# Patient Record
Sex: MALE | Race: WHITE | Employment: FULL TIME | URBAN - METROPOLITAN AREA
[De-identification: names, ages, dates, MRNs, and addresses within clinical notes are randomized per-mention and may not be internally consistent; named-entity substitution may affect disease eponyms.]

---

## 2017-07-06 ENCOUNTER — HOSPITAL ENCOUNTER (OUTPATIENT)
Dept: PHYSICAL THERAPY | Age: 62
Discharge: HOME | End: 2017-07-06
Payer: COMMERCIAL

## 2017-07-06 PROCEDURE — 97530 THERAPEUTIC ACTIVITIES: CPT

## 2017-07-06 PROCEDURE — 97161 PT EVAL LOW COMPLEX 20 MIN: CPT

## 2017-07-06 PROCEDURE — 97110 THERAPEUTIC EXERCISES: CPT

## 2017-07-06 NOTE — PROGRESS NOTES
[address]es [address]  [facility] PHYSICAL THERAPY  [address] [facility] Stephanie Guzman, [address] - Phone: [phone]  Fax: [phone] / [address]  Patient Name: Lora Alonzo : 1955   Medical   Diagnosis: Low back pain [M54.5] Treatment Diagnosis: L5-S1 LLE lumbar radiculopathy   Onset Date: 1 year ago AGE: 63 y.o. Referral Source: Davy Degroot MD [facility]): 2017   Prior Hospitalization: See medical history Provider #: [id number]   Prior Level of Function: Independent with limitations ambulating > 45 minutes   Comorbidities: Hx Prostate cancer   Medications: Verified on Patient Summary List   The Plan of Care and following information is based on the information from the initial evaluation.   =======================================================================================  Assessment / key information:  Patient is a 63 y.o. male who presents with chief complaint of constant numbness/tingling in L lateral calf and digits 3-5 of L foot (L5-S1). Pt reports symptoms began approximately 1 year ago and worsen with prolonged ambulation. Pt presents to PT evaluation today with postural deficiencies, 6-7/10 numbness/tingling in the areas described above, dec knee flexion, hip abduction/extension strength and lumbar instability. Pt demo ability to reduce intensity of symptoms with repeated L/S extension both in WB and non-WB positions. Patient would benefit from skilled PT services to address these issues and improve core stability, postural deficits, LE flexibility, scapulothoracic and LE strength .   Thank you for this referral       Objective Data:  ROM/Strength                 Strength (1-5)  Hip Left Right   Flexion (L1,L2) 5 5   Extension 4 4+   Abduction 4- 4-   ER 4+ 4+   IR 4+ 4+   Knee Left Right   Extension (L3,L4) 5 5   Flexion (S1,S2) 4 5   Ankle Left Right   PF (S1) 4+ 4+   DF (L4) 5 5 ======================================================================================  Eval Complexity: History: MEDIUM  Complexity : 1-2 comorbidities / personal factors will impact the outcome/ POC Exam:MEDIUM Complexity : 3 Standardized tests and measures addressing body structure, function, activity limitation and / or participation in recreation  Presentation: LOW Complexity : Stable, uncomplicated  Clinical Decision Making:LOW Complexity : FOTO score of 75-100Overall Complexity:LOW   Problem List: decrease ROM, decrease strength, decrease ADL/ functional abilitiies, decrease activity tolerance and decrease flexibility/ joint mobility   Treatment Plan may include any combination of the following: Therapeutic exercise, Therapeutic activities, Neuromuscular re-education, Physical agent/modality, Gait/balance training, Manual therapy, Aquatic therapy, Patient education, Functional mobility training and Stair training  Patient / Family readiness to learn indicated by: asking questions, trying to perform skills and interest  Persons(s) to be included in education: patient (P)  Barriers to Learning/Limitations: None  Measures taken:    Patient Goal (s): \"fix the problem\"   Patient self reported health status: good  Rehabilitation Potential: good   Short Term Goals: To be accomplished in  2  Weeks:  1. Patient will be compliant with HEP in order to facilitate progression of therapeutic exercise and improve mobility  2. Patient will demonstrate ability to independently centralize LLE radicular symptoms to level of L/S in order to dec LE dysfunction  3. Patient will report GROC >/= +2 in order to demonstrate improvement in symptoms since IE     Long Term Goals: To be accomplished in  4  Weeks:  1. Patient will be independent with HEP in order to demonstrate ability to perform therapeutic exercises and continue progressing functional mobility upon D/C  2.  Patient will report ability to ambulate >45 minutes without LLE radicular symptoms to improve QOL and reduce LE dysfunction  3. Patient will improve FOTO score to 85/100 to demonstrate a meaningful improvement in functional mobility and increased quality of life      Frequency / Duration:   Patient to be seen  2  times per week for 4  weeks:  Patient / Caregiver education and instruction: self care, activity modification and exercises  G-Codes (GP): fabiola  Therapist Signature: Silvano Johnson DPT Date: 2/8/1672   Certification Period: na Time: 9:54 AM   ===========================================================================================  I certify that the above Physical Therapy Services are being furnished while the patient is under my care. I agree with the treatment plan and certify that this therapy is necessary. Physician Signature:        Date:       Time:     Please sign and return to [employer] or you may fax the signed copy to [phone]. Thank you.

## 2017-07-06 NOTE — PROGRESS NOTES
PHYSICAL THERAPY - DAILY TREATMENT NOTE    Patient Name: Jabier Hodge        Date: 2017  : 1955   YES Patient  Verified  Visit #:   1   of   8  Insurance: Payor: Marisol Correa / Plan: [facility] PPO / Product Type: PPO /      In time: 10:00 Out time: 10:55   Total Treatment Time: 55 min     TREATMENT AREA =  Lumbar radiculopathy    SUBJECTIVE    Pain Level (on 0 to 10 scale):  0  / 10, 6-7/10 numbness   Medication Changes/New allergies or changes in medical history, any new surgeries or procedures? NO    If yes, update Summary List   Subjective Functional Status/Changes:  []  No changes reported     Patient reports onset of LLE numbness that started about a year or two ago. Pt was sent to a specialist and referred to physical therapy. Pt reports the toes of L foot 3-5 get numb. Worse with walking. Also reports numbness/tingling along L lateral calf. Pt reports he recently went on a 6 mile walk and felt extreme numbness. OBJECTIVE    Physical Therapy Evaluation - Lumbar Spine ([facility])    SUBJECTIVE  Chief Complaint: LLE numbness/tingling    Mechanism of injury: Insidious    Occupation/Recreation: Pianist, Professor    Functional Status  Prior level of function: Independent  Present functional limitations: Prolonged walking  What position do you sleep in?:    Symptoms:  Pain rating (0-10): Today: 0   Best: 0   Worst: 0   [x] Contstant: constant numbness/tingling   [] Intermittent:     Aggravated by:   [] Bending [] Sitting [] Standing [x] Walking   [] Moving [] Cough [] Sneeze [] Valsalva   [] AM  [] PM  Lying:  [] sup   [] pro   [] sidelying   [] Other:     Eased by:  Massage, taking off shoe   [] Bending [] Sitting [] Standing [] Walking   [] Moving [] AM  [] PM  Lying: [] sup  [] pro  [] sidelying   [] Other:     General Health:  Red Flags Indicated? [] Yes    [] No  [] Yes [x] No Recent weight change (If yes, due to dieting?  [] Yes  [] No)   [] Yes [x] No Weakness in legs during walking  [] Yes [x] No Unremitting pain at night  [] Yes [x] No Abdominal pain or problems  [] Yes [x] No Rectal bleeding  [] Yes [x] No Feet more cold or painful in cold weather  [] Yes [x] No Menstrual irregularities  [] Yes [x] No Blood or pain with urination  [] Yes [x] No Dysfunction of bowel or bladder  [] Yes [x] No Recent illness within past 3 weeks (i.e, cold, flu)  [] Yes [x] No Numbness/tingling in buttock/genitalia region    Past History/Treatments:     Diagnostic Tests: [] Lab work [] X-rays    [] CT [] MRI     [] Other:  Results: None    OBJECTIVE  Posture:  Lateral Shift: [x] R    [x] L     [] +  [x] -  Kyphosis: [x] Increased [] Decreased   []  WNL  Lordosis:  [] Increased [x] Decreased   [] WNL  Pelvic symmetry: [x] WNL    [] Other:    Gait:  [x] Normal     [] Abnormal:    Active Movements: [] N/A   [] Too acute   [] Other:  ROM AROM Comments:pain, area   Forward flexion 40-60 Fingertips to mid tibia    Extension 20-30 50% dec    SB right 20-30 WNL    SB left 20-30 WNL    Rotation right 5-10 25% dec    Rotation left 5-10 25% dec       Repeated Movements   Effects on present pain: produces (LA), abolishes (A), increases (incr), decreases(decr), centralizes (C), peripheral (PH), no effect (NE)   Pre-Test Sx Flexion Repeated Flexion Extension Repeated Extension Repeated SBL Repeated SBR   Sitting  NE NE NE NE NT NT   Standing  NE NE NE Decr NE Decr   Lying  NT    N/A N/A   Comments:  Side Glide:  Sustained passive positioning test:      ROM/Strength  Strength (1-5)  Hip Left Right   Flexion (L1,L2) 5 5   Extension 4 4+   Abduction 4- 4-   ER 4+ 4+   IR 4+ 4+   Knee Left Right   Extension (L3,L4) 5 5   Flexion (S1,S2) 4 5   Ankle Left Right   PF (S1) 4+ 4+   DF (L4) 5 5       Neuro Screen [] WNL  Myotome/Dermatome/Reflexes: Dec SSLT L5-S1 dermatome    Dural Mobility:  SLR Sitting: [x] R    [x] L    [] +    [x] -  @ (degrees):           Supine: [x] R    [x] L    [] +    [x] -  @ (degrees):   Slump Test: [x] R    [x] L    [] +    [x] -  @ (degrees):     Palpation  [] Min  [x] Mod  [] Severe    Location: hamstrings, ITB bilaterally  [] Min  [] Mod  [] Severe    Location:  [] Min  [] Mod  [] Severe    Location:      10 min Therapeutic Exercise:  [x]  See flow sheet   Rationale: To reduce numbness/tingling in LLE in order to dec LE dysfunction    8 min Therapeutic Activity: FOTO   Rationale: To assess current functional limitations and review POC    throughout min Patient Education:  YES  Reviewed HEP   []  Progressed/Changed HEP based on: Other Objective/Functional Measures:    See objective data above     Post Treatment Pain Level (on 0 to 10) scale:   0  / 10     ASSESSMENT    Assessment/Changes in Function:     Patient History: Moderate (Prostate cancer, Age)  Examination (low 1-2, mod 3+, high 4+): Moderate per objective above  Clinical Presentation (low stable or uncomplicated, mod evolving or changing, high unstable or unpredictable): Low stable  Clinical Decision Making (low , mod 26-74, high 1-25): FOTO Low     [x]  See Progress Note/Recertification   Patient will continue to benefit from skilled PT services to modify and progress therapeutic interventions, address functional mobility deficits, address ROM deficits, address strength deficits, analyze and address soft tissue restrictions, analyze and cue movement patterns, analyze and modify body mechanics/ergonomics, assess and modify postural abnormalities, address imbalance/dizziness and instruct in home and community integration to attain remaining goals.    Progress toward goals / Updated goals:    See plan of care     PLAN    [x]  Upgrade activities as tolerated YES Continue plan of care   []  Discharge due to :    []  Other:

## 2017-07-17 ENCOUNTER — HOSPITAL ENCOUNTER (OUTPATIENT)
Dept: PHYSICAL THERAPY | Age: 62
Discharge: HOME | End: 2017-07-17
Payer: COMMERCIAL

## 2017-07-17 PROCEDURE — 97110 THERAPEUTIC EXERCISES: CPT

## 2017-07-17 PROCEDURE — 97530 THERAPEUTIC ACTIVITIES: CPT

## 2017-07-17 NOTE — PROGRESS NOTES
PHYSICAL THERAPY - DAILY TREATMENT NOTE    Patient Name: Mecca Wilkins        Date: 2017  : 1955   yes Patient  Verified  Visit #:   2   of   8  Insurance: Payor: BLUE CROSS / Plan: [facility] PPO / Product Type: PPO /      In time: 400 Out time: 435   Total Treatment Time: 35     TREATMENT AREA =  Low back pain [M54.5]    SUBJECTIVE  Pain Level (on 0 to 10 scale):  3  / 10   Medication Changes/New allergies or changes in medical history, any new surgeries or procedures?    no  If yes, update Summary List   Subjective Functional Status/Changes:  []  No changes reported     \"I am not really seeing any change. Sometimes it isnt there. I can ride my bike and be ok for 6 miles.  I  did try the extensions and it wasn't changing at all\"          OBJECTIVE      27 min Therapeutic Exercise:  [x]  See flow sheet   Rationale:      increase ROM, increase strength and improve coordination to improve the patients ability to perform ADLs/ bending/stooping/ lifting/prolong sitting, stding and amb/ stairs with ease        8 min Therapeutic Activity: postural training   Rationale:    increase ROM and increase strength to improve the patients ability to demo proper posture with ease       min Patient Education:  yes  Reviewed HEP   []  Progressed/Changed HEP based on:  Pt ed on importance and benefits of compliance with HEP, core strength/stability and proper posture; pt verbalized understanding         Other Objective/Functional Measures:  prone lying>YOLI>bed elevated-B/NE  ARMIN=NE/NE  Rep L SG at wall-NE/NE  Rep step stance flex with L Rot- NE/NE  REp step stance flex with R Rot-B/B; instructed to perform every hr x 10    VCs + demo to perform proper technique for TE  Initiated TE per flowsheet without c/o p!  demos poor posture improved with VCs       Post Treatment Pain Level (on 0 to 10) scale:   3  / 10     ASSESSMENT  Assessment/Changes in Function:     Progressed there-ex without c/o increase p! demos significant decrease in HS flexibility      []  See Progress Note/Recertification   Patient will continue to benefit from skilled PT services to modify and progress therapeutic interventions, address functional mobility deficits, address ROM deficits, address strength deficits, analyze and address soft tissue restrictions, analyze and cue movement patterns, analyze and modify body mechanics/ergonomics, assess and modify postural abnormalities and instruct in home and community integration to attain remaining goals.    Progress toward goals / Updated goals:    Pt's first visit since IE, no noted progress        PLAN  []  Upgrade activities as tolerated yes Continue plan of care   []  Discharge due to :    []  Other:      Therapist: Sanford Magdaleno PTA    Date: 7/17/2017 Time: 5:10 PM     Future Appointments  Date Time Provider Juanita Smiley   7/24/2017 10:00 AM Waqas Dejesus PT [facility]   7/28/2017 1:00 PM Sanford Magdaleno PTA [facility]   7/31/2017 10:30 AM Waqas Dejesus PT [facility]   8/4/2017 10:30 AM Sanford Magdaleno PTA [facility]   8/7/2017 10:30 AM [facility]   8/11/2017 10:30 AM Sanford Magdaleno PTA [facility]

## 2017-07-24 ENCOUNTER — APPOINTMENT (OUTPATIENT)
Dept: PHYSICAL THERAPY | Age: 62
End: 2017-07-24
Payer: COMMERCIAL

## 2017-07-28 ENCOUNTER — HOSPITAL ENCOUNTER (OUTPATIENT)
Dept: PHYSICAL THERAPY | Age: 62
Discharge: HOME | End: 2017-07-28
Payer: COMMERCIAL

## 2017-07-28 PROCEDURE — 97530 THERAPEUTIC ACTIVITIES: CPT

## 2017-07-28 PROCEDURE — 97110 THERAPEUTIC EXERCISES: CPT

## 2017-07-28 NOTE — PROGRESS NOTES
PHYSICAL THERAPY - DAILY TREATMENT NOTE    Patient Name: Lora Figures        Date: 2017  : 1955   yes Patient  Verified  Visit #:   3 of   8  Insurance: Payor: Magdalena Martin / Plan: [facility] PPO / Product Type: PPO /      In time: 100 Out time: 152   Total Treatment Time: 52     TREATMENT AREA =  Low back pain [M54.5]    SUBJECTIVE  Pain Level (on 0 to 10 scale):  0  / 10   Medication Changes/New allergies or changes in medical history, any new surgeries or procedures?    no  If yes, update Summary List   Subjective Functional Status/Changes:  []  No changes reported     \"I did my exercises and it really didn't change much. I still have p! down the leg 7/10 sometimes           OBJECTIVE      44 min Therapeutic Exercise:  [x]  See flow sheet   Rationale:      increase ROM, increase strength and improve coordination to improve the patients ability to perform ADLs/ bending/stooping/ lifting/prolong sitting, stding and amb/ stairs with ease        8 min Therapeutic Activity: postural training   Rationale:    increase ROM and increase strength to improve the patients ability to demo proper posture with ease       min Patient Education:  yes  Reviewed HEP   []  Progressed/Changed HEP based on:  Pt ed on importance and benefits of compliance with HEP, core strength/stability and proper posture; pt verbalized understanding     Other Objective/Functional Measures:    VCs + demo to perform proper technique for TE  initiated TM retro; t/s ext over chair, scap stabs and mod scott str without c/o p!  continues to demo poor posture with sitting; improved with VCs     Post Treatment Pain Level (on 0 to 10) scale:  0 / 10     ASSESSMENT  Assessment/Changes in Function:     Progressed there-ex without c/o increase p!        []  See Progress Note/Recertification   Patient will continue to benefit from skilled PT services to modify and progress therapeutic interventions, address functional mobility deficits, address ROM deficits, address strength deficits, analyze and address soft tissue restrictions, analyze and cue movement patterns, analyze and modify body mechanics/ergonomics, assess and modify postural abnormalities and instruct in home and community integration to attain remaining goals. Progress toward goals / Updated goals: · Short Term Goals: To be accomplished in  2  Weeks:  1. Patient will be compliant with HEP in order to facilitate progression of therapeutic exercise and improve mobility. MET  2. Patient will demonstrate ability to independently centralize LLE radicular symptoms to level of L/S in order to dec LE dysfunction  3. Patient will report GROC >/= +2 in order to demonstrate improvement in symptoms since IE     · Long Term Goals: To be accomplished in  4  Weeks:  1. Patient will be independent with HEP in order to demonstrate ability to perform therapeutic exercises and continue progressing functional mobility upon D/C  2. Patient will report ability to ambulate >45 minutes without LLE radicular symptoms to improve QOL and reduce LE dysfunction  3.  Patient will improve FOTO score to 85/100 to demonstrate a meaningful improvement in functional mobility and increased quality of life          PLAN  []  Upgrade activities as tolerated yes Continue plan of care   []  Discharge due to :    []  Other:      Therapist: Gab Gomez PTA    Date: 7/28/2017 Time: 1:06 PM     Future Appointments  Date Time Provider Juanita Smiley   7/31/2017 10:30 AM Justyna Cueva PT [facility]   8/4/2017 10:30 AM [facility]   8/7/2017 10:30 AM [facility]   8/11/2017 10:30 AM Gab Gomez PTA [facility]

## 2017-07-31 ENCOUNTER — HOSPITAL ENCOUNTER (OUTPATIENT)
Dept: PHYSICAL THERAPY | Age: 62
Discharge: HOME | End: 2017-07-31
Payer: COMMERCIAL

## 2017-07-31 PROCEDURE — 97110 THERAPEUTIC EXERCISES: CPT

## 2017-07-31 NOTE — PROGRESS NOTES
PHYSICAL THERAPY - DAILY TREATMENT NOTE    Patient Name: Juan Bah        Date: 2017  : 1955   YES Patient  Verified  Visit #:   4   of   8  Insurance: Payor: Mavis Bone / Plan: [id number] PPO / Product Type: PPO /      In time: 10:30 Out time: 11:10   Total Treatment Time: 40     Medicare Time Tracking (below)   Total Timed Codes (min):  NA 1:1 Treatment Time:  NA     TREATMENT AREA =  L/S radic    SUBJECTIVE    Pain Level (on 0 to 10 scale):  1  / 10 foot    Medication Changes/New allergies or changes in medical history, any new surgeries or procedures? NO    If yes, update Summary List   Subjective Functional Status/Changes:  []  No changes reported     \"It doesn't;t hurt when I sit. It's really only my outside two toes and tenderness in my lateral calf. If I even shift my weight off its better. \"         OBJECTIVE      40 min Therapeutic Exercise:  [x]  See flow sheet   Rationale:      increase ROM and increase strength to improve the patients ability to manage symptoms      min Patient Education:  Cisco Wellington   []  Progressed/Changed HEP based on:   Updated HEP     Other Objective/Functional Measures:    Instructed on ankle exercises due to no change thus far with spinal exercises. Diff Dx to rule out distal origin     Post Treatment Pain Level (on 0 to 10) scale:   0  / 10     ASSESSMENT    Assessment/Changes in Function:     No changes thus far     []  See Progress Note/Recertification   Patient will continue to benefit from skilled PT services to analyze,, cue,, progress,, modify,, demonstrate,, instruct, and address, movement patterns,, therapeutic interventions,, postural abnormalities,, soft tissue restrictions,, ROM,, strength,, functional mobility,, body mechanics/ergonomics, and home and community integration, to attain remaining goals.    Progress toward goals / Updated goals:    Reassess NV at 5th visit     PLAN    []  Upgrade activities as tolerated YES Continue plan of care   []  Discharge due to :    []  Other:      Therapist: Filomena Byrnes DPT    Date: 7/31/2017 Time: 10:29 AM   Future Appointments  Date Time Provider Juanita Smiley   7/31/2017 10:30 AM Royal Cobb [facility]   8/4/2017 10:30 AM Raven Gustafson [facility]   8/7/2017 10:30 AM Arabella Thakkar [facility]   8/11/2017 10:30 AM Raven Gustafson [facility]

## 2017-08-04 ENCOUNTER — HOSPITAL ENCOUNTER (OUTPATIENT)
Dept: PHYSICAL THERAPY | Age: 62
Discharge: HOME | End: 2017-08-04
Payer: COMMERCIAL

## 2017-08-04 PROCEDURE — 97110 THERAPEUTIC EXERCISES: CPT

## 2017-08-04 NOTE — PROGRESS NOTES
PHYSICAL THERAPY - DAILY TREATMENT NOTE    Patient Name: Rufina Sotelo        Date: 2017  : 1955   yes Patient  Verified  Visit #:   5 of   8  Insurance: Payor: Sridhar Bahena / Plan: [facility] PPO / Product Type: PPO /      In time: 1030 Out time: 1110   Total Treatment Time: 40     TREATMENT AREA =  Low back pain [M54.5]    SUBJECTIVE  Pain Level (on 0 to 10 scale):  2  / 10   Medication Changes/New allergies or changes in medical history, any new surgeries or procedures?    no  If yes, update Summary List   Subjective Functional Status/Changes:  []  No changes reported     \"I really did not notice much change at all with the new stuff she added 2 days ago. I would like to do maybe appts further apart to see if it helps, I do not think 2 days is a enough\"           OBJECTIVE      40 min Therapeutic Exercise:  [x]  See flow sheet   Rationale:      increase ROM, increase strength and improve coordination to improve the patients ability to perform ADLs/ bending/stooping/ lifting/prolong sitting, stding and amb/ stairs with ease       min Patient Education:  yes  Reviewed HEP   []  Progressed/Changed HEP based on:  Pt ed on importance and benefits of compliance with HEP, core strength/stability and proper posture; pt verbalized understanding     Other Objective/Functional Measures:    VCs + demo to perform proper technique for TE  initiated TM retro; HRs/TRs and bridges without c/o p!  demos fair+ bridge     Post Treatment Pain Level (on 0 to 10) scale:  1 / 10     ASSESSMENT  Assessment/Changes in Function:   demos 50% HR AROM  Progressed there-ex without c/o increase p!    []  See Progress Note/Recertification   Patient will continue to benefit from skilled PT services to modify and progress therapeutic interventions, address functional mobility deficits, address ROM deficits, address strength deficits, analyze and address soft tissue restrictions, analyze and cue movement patterns, analyze and modify body mechanics/ergonomics, assess and modify postural abnormalities and instruct in home and community integration to attain remaining goals. Progress toward goals / Updated goals: · Short Term Goals: To be accomplished in  2  Weeks:  1. Patient will be compliant with HEP in order to facilitate progression of therapeutic exercise and improve mobility. MET  2. Patient will demonstrate ability to independently centralize LLE radicular symptoms to level of L/S in order to dec LE dysfunction  3. Patient will report GROC >/= +2 in order to demonstrate improvement in symptoms since IE     · Long Term Goals: To be accomplished in  4  Weeks:  1. Patient will be independent with HEP in order to demonstrate ability to perform therapeutic exercises and continue progressing functional mobility upon D/C  2. Patient will report ability to ambulate >45 minutes without LLE radicular symptoms to improve QOL and reduce LE dysfunction  3.  Patient will improve FOTO score to 85/100 to demonstrate a meaningful improvement in functional mobility and increased quality of life          PLAN  []  Upgrade activities as tolerated yes Continue plan of care   []  Discharge due to :    []  Other:      Therapist: Lizbeth Hill PTA    Date: 8/4/2017 Time: 4:05 PM     Future Appointments  Date Time Provider Juanita Smiley   8/11/2017 10:30 AM [facility]   8/18/2017 10:30 AM [facility]   8/25/2017 10:30 AM Lizbeth Hill PTA [facility]

## 2017-08-07 ENCOUNTER — APPOINTMENT (OUTPATIENT)
Dept: PHYSICAL THERAPY | Age: 62
End: 2017-08-07
Payer: COMMERCIAL

## 2017-08-11 ENCOUNTER — HOSPITAL ENCOUNTER (OUTPATIENT)
Dept: PHYSICAL THERAPY | Age: 62
Discharge: HOME | End: 2017-08-11
Payer: COMMERCIAL

## 2017-08-11 PROCEDURE — 97110 THERAPEUTIC EXERCISES: CPT

## 2017-08-11 NOTE — PROGRESS NOTES
PHYSICAL THERAPY - DAILY TREATMENT NOTE    Patient Name: Josefa Post        Date: 2017  : 1955   yes Patient  Verified  Visit #:   6    8  Insurance: Payor: BLUE CROSS / Plan: [facility] PPO / Product Type: PPO /      In time: 1030 Out time: 1116   Total Treatment Time: 46     TREATMENT AREA =  Low back pain [M54.5]    SUBJECTIVE  Pain Level (on 0 to 10 scale):  2-3   10   Medication Changes/New allergies or changes in medical history, any new surgeries or procedures?    no  If yes, update Summary List   Subjective Functional Status/Changes:  []  No changes reported     \"last time I was here I was good for 3 days and then it came back\"           OBJECTIVE      46 min Therapeutic Exercise:  [x]  See flow sheet   Rationale:      increase ROM, increase strength and improve coordination to improve the patients ability to perform ADLs/ bending/stooping/ lifting/prolong sitting, stding and amb/ stairs with ease       min Patient Education:  yes  Reviewed HEP   []  Progressed/Changed HEP based on:  Pt ed on importance and benefits of compliance with HEP, core strength/stability and proper posture; pt verbalized understanding     Other Objective/Functional Measures:    VCs + demo to perform proper technique for TE  initiated prone glute kicks and performed t/s ext on FR without c/o P!  demos 75% HRS AROM     Post Treatment Pain Level (on 0 to 10) scale:  2  10     ASSESSMENT  Assessment/Changes in Function:   increased 25% HR AROM  Progressed there-ex without c/o increase p!    []  See Progress Note/Recertification   Patient will continue to benefit from skilled PT services to modify and progress therapeutic interventions, address functional mobility deficits, address ROM deficits, address strength deficits, analyze and address soft tissue restrictions, analyze and cue movement patterns, analyze and modify body mechanics/ergonomics, assess and modify postural abnormalities and instruct in home and community integration to attain remaining goals. Progress toward goals / Updated goals: · Short Term Goals: To be accomplished in  2  Weeks:  1. Patient will be compliant with HEP in order to facilitate progression of therapeutic exercise and improve mobility. MET  2. Patient will demonstrate ability to independently centralize LLE radicular symptoms to level of L/S in order to dec LE dysfunction  3. Patient will report GROC >/= +2 in order to demonstrate improvement in symptoms since IE     · Long Term Goals: To be accomplished in  4  Weeks:  1. Patient will be independent with HEP in order to demonstrate ability to perform therapeutic exercises and continue progressing functional mobility upon D/C  2. Patient will report ability to ambulate >45 minutes without LLE radicular symptoms to improve QOL and reduce LE dysfunction  3.  Patient will improve FOTO score to 85/100 to demonstrate a meaningful improvement in functional mobility and increased quality of life          PLAN  []  Upgrade activities as tolerated yes Continue plan of care   []  Discharge due to :    []  Other:      Therapist: Jasmin Greer PTA    Date: 8/11/2017 Time: 4:05 PM     Future Appointments  Date Time Provider Juanita Smiley   [facility]   8/25/2017 10:30 AM Jasmin Greer PTA [facility]

## 2017-08-18 ENCOUNTER — HOSPITAL ENCOUNTER (OUTPATIENT)
Dept: PHYSICAL THERAPY | Age: 62
Discharge: HOME | End: 2017-08-18
Payer: COMMERCIAL

## 2017-08-18 PROCEDURE — 97110 THERAPEUTIC EXERCISES: CPT

## 2017-08-18 NOTE — PROGRESS NOTES
PHYSICAL THERAPY - DAILY TREATMENT NOTE    Patient Name: Mecca Wilkins        Date: 2017  : 1955   yes Patient  Verified  Visit #:   7   Insurance: Payor: BLUE CROSS / Plan: [facility] PPO / Product Type: PPO /      In time: 1030 Out time: 1115   Total Treatment Time: 45     TREATMENT AREA =  Low back pain [M54.5]    SUBJECTIVE  Pain Level (on 0 to 10 scale):  3  / 10   Medication Changes/New allergies or changes in medical history, any new surgeries or procedures?    no  If yes, update Summary List   Subjective Functional Status/Changes:  []  No changes reported     \"I am not seeing too much same, sometimes better. I can walk but I feel it most times. I think I need to f/u with my neurologist.\"           OBJECTIVE      45 min Therapeutic Exercise:  [x]  See flow sheet   Rationale:      increase ROM, increase strength and improve coordination to improve the patients ability to perform ADLs/ bending/stooping/ lifting/prolong sitting, stding and amb/ stairs with ease       min Patient Education:  yes  Reviewed HEP   []  Progressed/Changed HEP based on:  Pt ed on importance and benefits of compliance with HEP, core strength/stability and proper posture; pt verbalized understanding  See updated HEP in chart       Other Objective/Functional Measures:  rep DF with L eversion - B/B; decrease sxs; instructed to perform every 2 hrs x 10; pt verbalized understanding  hip abd strength L=3+/5, R=4/5, hip ext: L=3+/5; R=3+/5  VCs + demo to perform proper technique for TE  initiated SL L hip abd, SL hot potatoe, and B hip ext without c/o p! GROC=+1     Post Treatment Pain Level (on 0 to 10) scale:  \"better\" / 10     ASSESSMENT  Assessment/Changes in Function:   GROC indicates \"a tiny bit better; almost the same\"  Progressed there-ex without c/o increase p!    []  See Progress Note/Recertification   Patient will continue to benefit from skilled PT services to modify and progress therapeutic interventions, address functional mobility deficits, address ROM deficits, address strength deficits, analyze and address soft tissue restrictions, analyze and cue movement patterns, analyze and modify body mechanics/ergonomics, assess and modify postural abnormalities and instruct in home and community integration to attain remaining goals. Progress toward goals / Updated goals: · Short Term Goals: To be accomplished in  2  Weeks:  1. Patient will be compliant with HEP in order to facilitate progression of therapeutic exercise and improve mobility. MET  2. Patient will demonstrate ability to independently centralize LLE radicular symptoms to level of L/S in order to dec LE dysfunction. progressing, able to abolish, unable to maintain  3. Patient will report GROC >/= +2 in order to demonstrate improvement in symptoms since IE. progressing slowly; +1     · Long Term Goals: To be accomplished in  4  Weeks:  1. Patient will be independent with HEP in order to demonstrate ability to perform therapeutic exercises and continue progressing functional mobility upon D/C. MET  2. Patient will report ability to ambulate >45 minutes without LLE radicular symptoms to improve QOL and reduce LE dysfunction. progressing; intermittent decrease in sxs  3. Patient will improve FOTO score to 85/100 to demonstrate a meaningful improvement in functional mobility and increased quality of life.  NT          PLAN  [x]  Upgrade activities as tolerated yes Continue plan of care   []  Discharge due to :    []  Other:      Therapist: Chaim Cervantes PTA    Date: 8/18/2017 Time: 10:23 AM     Future Appointments  Date Time Provider Juanita Smiley   8/18/2017 10:30 AM [facility]   8/25/2017 10:30 AM Chaim Cervantes PTA [facility]

## 2017-08-18 NOTE — PROGRESS NOTES
[address] [facility] PHYSICAL THERAPY  [address], [place], [address] - Phone: [phone]  Fax: [phone]  PROGRESS NOTE  Patient Name: Vipul Conway : 1955   Treatment/Medical Diagnosis: Low back pain [M54.5]   Referral Source: Lulu Cantu MD     Date of Initial Visit: 17 Attended Visits: 7 Missed Visits: 0     SUMMARY OF TREATMENT  Pt's rx consist of an active warm up on TM, LE/core/L/s/scap strengthening/stability/stretching TE, and instruction on HEP + proper body mechanics  CURRENT STATUS  Pt with min progress due to focusing on radic sxs from L/s; however, with further assessment pt demos decrease L hip abd/ext strength, and L tib anterior tightness. Hip abd strength L=3+/5, R=4/5, hip ext: L=3+/5; R=3+/5. Pt continues to have intermittent sxs; elevated with increase amb. Pt is (I) with HEP and is able to decrease sxs with rep L ankle eversion + DF; however unable to abolish. Goal/Measure of Progress Goal Met? 1. Patient will be independent with HEP in order to demonstrate ability to perform therapeutic exercises and continue progressing functional mobility upon D/C. Status at last Eval: compliant with HEP  Current Status: (I) with HEP yes   2. Patient will report ability to ambulate >45 minutes without LLE radicular symptoms to improve QOL and reduce LE dysfunction. Status at last Eval: unable  Current Status: intermittent reduction in radic sxs progressing   3. Patient will improve FOTO score to 85/100 to demonstrate a meaningful improvement in functional mobility and increased quality of life   Status at last Eval: 78 Current Status: NT n/a     New Goals to be achieved in __1-3_  weeks:  1. Patient will report ability to ambulate >45 minutes without LLE radicular symptoms to improve QOL and reduce LE dysfunction.    2. Patient will improve FOTO score to 85/100 to demonstrate a meaningful improvement in functional mobility and increased quality of life  RECOMMENDATIONS  Pt to return for 1 more x visit to assess sxs after given HEP to strengthen glutes and decrease L tib ant tightness. Pt d/c and f/u with MD and continue HEP (I) If returns to PT with no change. Otherwise pt will return to PT to continue to strengthen and decrease sxs 1x wk x 2-4 wks. If you have any questions/comments please contact us directly at [phone]. Thank you for allowing us to assist in the care of your patient. LPTA Signature: Ronda Woodall PTA  Date: 8/18/2017   PT Signature: Clyde Gómez DPT Time: 11:59 AM   NOTE TO PHYSICIAN:  PLEASE COMPLETE THE ORDERS BELOW AND FAX TO   [facility]: [phone]. If you are unable to process this request in 24 hours please contact our office: [phone].    ___ I have read the above report and request that my patient continue as recommended.   ___ I have read the above report and request that my patient continue therapy with the following changes/special instructions:_________________________________________________________   ___ I have read the above report and request that my patient be discharged from therapy.      Physician Signature:        Date:       Time:

## 2017-08-25 ENCOUNTER — APPOINTMENT (OUTPATIENT)
Dept: PHYSICAL THERAPY | Age: 62
End: 2017-08-25
Payer: COMMERCIAL

## 2017-09-01 ENCOUNTER — HOSPITAL ENCOUNTER (OUTPATIENT)
Dept: PHYSICAL THERAPY | Age: 62
Discharge: HOME | End: 2017-09-01
Payer: COMMERCIAL

## 2017-09-01 PROCEDURE — 97110 THERAPEUTIC EXERCISES: CPT

## 2017-09-01 NOTE — PROGRESS NOTES
PHYSICAL THERAPY - DAILY TREATMENT NOTE    Patient Name: Yann Martin        Date: 2017  : 1955   yes Patient  Verified  Visit #:     Insurance: Payor: BLUE CROSS / Plan: [facility] PPO / Product Type: PPO /      In time: 1005 Out time: 1100   Total Treatment Time: 55     TREATMENT AREA =  Low back pain [M54.5]    SUBJECTIVE  Pain Level (on 0 to 10 scale):  3  / 10   Medication Changes/New allergies or changes in medical history, any new surgeries or procedures?    no  If yes, update Summary List   Subjective Functional Status/Changes:  []  No changes reported     \"I was doing good for a few days but the past 2 days I am getting more zing\"           OBJECTIVE      55 min Therapeutic Exercise:  [x]  See flow sheet   Rationale:      increase ROM, increase strength and improve coordination to improve the patients ability to perform ADLs/ bending/stooping/ lifting/prolong sitting, stding and amb/ stairs with ease       min Patient Education:  yes  Reviewed HEP   []  Progressed/Changed HEP based on:  Pt ed on importance and benefits of compliance with HEP, core strength/stability and proper posture; pt verbalized understanding       Other Objective/Functional Measures:    hip abd strength L=4-/5, R=4/5, hip ext: L=4-/5; R=4-/5  VCs + demo to perform proper technique for TE  initiated butt burners, bosu step up + knee drive, hip 3 way on foam, and quad fire hydrant without c/o p!  c/o L/s tightness after quad TE; no pain  discussed continuing strengthening and stability TE to decrease sxs and promote p! free amb/activity    Post Treatment Pain Level (on 0 to 10) scale:  2 / 10     ASSESSMENT  Assessment/Changes in Function:     Progressed there-ex without c/o increase p!    []  See Progress Note/Recertification   Patient will continue to benefit from skilled PT services to modify and progress therapeutic interventions, address functional mobility deficits, address ROM deficits, address strength deficits, analyze and address soft tissue restrictions, analyze and cue movement patterns, analyze and modify body mechanics/ergonomics, assess and modify postural abnormalities and instruct in home and community integration to attain remaining goals. Progress toward goals / Updated goals:  New Goals to be achieved in __1-3_  weeks:  1. Patient will report ability to ambulate >45 minutes without LLE radicular symptoms to improve QOL and reduce LE dysfunction. 2. Patient will improve FOTO score to 85/100 to demonstrate a meaningful improvement in functional mobility and increased quality of life     PLAN  [x]  Upgrade activities as tolerated yes Continue plan of care   []  Discharge due to :    []  Other:      Therapist: Chaim Cervantes PTA    Date: 9/1/2017 Time: 10:23 AM     No future appointments.

## 2017-12-15 NOTE — PROGRESS NOTES
[address] [facility] PHYSICAL THERAPY  [address] [place], [address] - Phone: [phone]  Fax: [phone]  DISCHARGE NOTE  Patient Name: Lauren Menezes : 1955   Treatment/Medical Diagnosis: Low back pain [M54.5]   Referral Source: Franklyn Gold MD     Date of Initial Visit: 17 Attended Visits: 8 Missed Visits: 1     SUMMARY OF TREATMENT  Pt's rx consist of an active warm up on TM, LE/core/L/s/scap strengthening/stability/stretching TE, and instruction on HEP + proper body mechanics  CURRENT STATUS  Pt with min progress from [facility], and did not return to PT after 17. Pt was to f/u with MD and continue HEP I. See PN below from 17. Goal/Measure of Progress Goal Met? 1. Patient will be independent with HEP in order to demonstrate ability to perform therapeutic exercises and continue progressing functional mobility upon D/C. Status at last Eval: compliant with HEP  Current Status: (I) with HEP yes   2. Patient will report ability to ambulate >45 minutes without LLE radicular symptoms to improve QOL and reduce LE dysfunction. Status at last Eval: unable  Current Status: intermittent reduction in radic sxs progressing   3. Patient will improve FOTO score to 85/100 to demonstrate a meaningful improvement in functional mobility and increased quality of life   Status at last Eval: 78 Current Status: NT n/a       RECOMMENDATIONS  Pt D/C with instructions to cont HEP Independently. If you have any questions/comments please contact us directly at [phone]. Thank you for allowing us to assist in the care of your patient. LPTA Signature: Paresh Miner PTA  Date: 12/15/2017   PT Signature: Yao Boyd DPT Time: 11:59 AM   NOTE TO PHYSICIAN:  PLEASE COMPLETE THE ORDERS BELOW AND FAX TO   [facility]: [phone].   If you are unable to process this request in 24 hours please contact our office: [phone].    ___ I have read the above report and request that my patient continue as recommended.   ___ I have read the above report and request that my patient continue therapy with the following changes/special instructions:_________________________________________________________   ___ I have read the above report and request that my patient be discharged from therapy.      Physician Signature:        Date:       Time:

## 2019-09-24 ENCOUNTER — HOSPITAL ENCOUNTER (EMERGENCY)
Age: 64
Discharge: HOME | End: 2019-09-24
Attending: EMERGENCY MEDICINE | Admitting: EMERGENCY MEDICINE
Payer: MEDICAID

## 2019-09-24 ENCOUNTER — APPOINTMENT (OUTPATIENT)
Dept: CT IMAGING | Age: 64
End: 2019-09-24
Attending: EMERGENCY MEDICINE
Payer: MEDICAID

## 2019-09-24 VITALS
BODY MASS INDEX: 21.48 KG/M2 | OXYGEN SATURATION: 99 % | RESPIRATION RATE: 18 BRPM | SYSTOLIC BLOOD PRESSURE: 120 MMHG | WEIGHT: 145 LBS | HEIGHT: 69 IN | TEMPERATURE: 98.2 F | HEART RATE: 62 BPM | DIASTOLIC BLOOD PRESSURE: 64 MMHG

## 2019-09-24 DIAGNOSIS — N20.0 RENAL STONES: ICD-10-CM

## 2019-09-24 DIAGNOSIS — N28.1 RENAL CYST, LEFT: ICD-10-CM

## 2019-09-24 DIAGNOSIS — R10.9 LEFT FLANK PAIN: Primary | ICD-10-CM

## 2019-09-24 LAB
ANION GAP SERPL CALC-SCNC: 5 MMOL/L (ref 3–18)
APPEARANCE UR: CLEAR
BASOPHILS # BLD: 0 K/UL (ref 0–0.1)
BASOPHILS NFR BLD: 0 % (ref 0–2)
BILIRUB UR QL: NEGATIVE
BUN SERPL-MCNC: 19 MG/DL (ref 7–18)
BUN/CREAT SERPL: 16 (ref 12–20)
CALCIUM SERPL-MCNC: 8.7 MG/DL (ref 8.5–10.1)
CHLORIDE SERPL-SCNC: 108 MMOL/L (ref 100–111)
CO2 SERPL-SCNC: 27 MMOL/L (ref 21–32)
COLOR UR: YELLOW
CREAT SERPL-MCNC: 1.16 MG/DL (ref 0.6–1.3)
DIFFERENTIAL METHOD BLD: ABNORMAL
EOSINOPHIL # BLD: 0.2 K/UL (ref 0–0.4)
EOSINOPHIL NFR BLD: 2 % (ref 0–5)
ERYTHROCYTE [DISTWIDTH] IN BLOOD BY AUTOMATED COUNT: 12.2 % (ref 11.6–14.5)
GLUCOSE SERPL-MCNC: 121 MG/DL (ref 74–99)
GLUCOSE UR STRIP.AUTO-MCNC: NEGATIVE MG/DL
HCT VFR BLD AUTO: 46.4 % (ref 36–48)
HGB BLD-MCNC: 16.1 G/DL (ref 13–16)
HGB UR QL STRIP: NEGATIVE
KETONES UR QL STRIP.AUTO: NEGATIVE MG/DL
LEUKOCYTE ESTERASE UR QL STRIP.AUTO: NEGATIVE
LYMPHOCYTES # BLD: 1.4 K/UL (ref 0.9–3.6)
LYMPHOCYTES NFR BLD: 19 % (ref 21–52)
MCH RBC QN AUTO: 31 PG (ref 24–34)
MCHC RBC AUTO-ENTMCNC: 34.7 G/DL (ref 31–37)
MCV RBC AUTO: 89.4 FL (ref 74–97)
MONOCYTES # BLD: 0.4 K/UL (ref 0.05–1.2)
MONOCYTES NFR BLD: 6 % (ref 3–10)
NEUTS SEG # BLD: 5.3 K/UL (ref 1.8–8)
NEUTS SEG NFR BLD: 73 % (ref 40–73)
NITRITE UR QL STRIP.AUTO: NEGATIVE
PH UR STRIP: 5.5 [PH] (ref 5–8)
PLATELET # BLD AUTO: 225 K/UL (ref 135–420)
PMV BLD AUTO: 10.6 FL (ref 9.2–11.8)
POTASSIUM SERPL-SCNC: 4.1 MMOL/L (ref 3.5–5.5)
PROT UR STRIP-MCNC: NEGATIVE MG/DL
RBC # BLD AUTO: 5.19 M/UL (ref 4.7–5.5)
SODIUM SERPL-SCNC: 140 MMOL/L (ref 136–145)
SP GR UR REFRACTOMETRY: >1.03 (ref 1–1.03)
UROBILINOGEN UR QL STRIP.AUTO: 0.2 EU/DL (ref 0.2–1)
WBC # BLD AUTO: 7.3 K/UL (ref 4.6–13.2)

## 2019-09-24 PROCEDURE — 99284 EMERGENCY DEPT VISIT MOD MDM: CPT

## 2019-09-24 PROCEDURE — 80048 BASIC METABOLIC PNL TOTAL CA: CPT

## 2019-09-24 PROCEDURE — 81003 URINALYSIS AUTO W/O SCOPE: CPT

## 2019-09-24 PROCEDURE — 74011636320 HC RX REV CODE- 636/320: Performed by: EMERGENCY MEDICINE

## 2019-09-24 PROCEDURE — 96374 THER/PROPH/DIAG INJ IV PUSH: CPT

## 2019-09-24 PROCEDURE — 74011250636 HC RX REV CODE- 250/636: Performed by: EMERGENCY MEDICINE

## 2019-09-24 PROCEDURE — 96375 TX/PRO/DX INJ NEW DRUG ADDON: CPT

## 2019-09-24 PROCEDURE — 74177 CT ABD & PELVIS W/CONTRAST: CPT

## 2019-09-24 PROCEDURE — 85025 COMPLETE CBC W/AUTO DIFF WBC: CPT

## 2019-09-24 RX ORDER — KETOROLAC TROMETHAMINE 30 MG/ML
15 INJECTION, SOLUTION INTRAMUSCULAR; INTRAVENOUS
Status: COMPLETED | OUTPATIENT
Start: 2019-09-24 | End: 2019-09-24

## 2019-09-24 RX ORDER — OXYCODONE HYDROCHLORIDE 5 MG/1
5 TABLET ORAL
Qty: 8 TAB | Refills: 0 | Status: SHIPPED | OUTPATIENT
Start: 2019-09-24 | End: 2019-10-01

## 2019-09-24 RX ORDER — MORPHINE SULFATE 4 MG/ML
4 INJECTION, SOLUTION INTRAMUSCULAR; INTRAVENOUS
Status: COMPLETED | OUTPATIENT
Start: 2019-09-24 | End: 2019-09-24

## 2019-09-24 RX ORDER — ONDANSETRON 2 MG/ML
4 INJECTION INTRAMUSCULAR; INTRAVENOUS
Status: COMPLETED | OUTPATIENT
Start: 2019-09-24 | End: 2019-09-24

## 2019-09-24 RX ADMIN — IOPAMIDOL 96 ML: 612 INJECTION, SOLUTION INTRAVENOUS at 12:46

## 2019-09-24 RX ADMIN — ONDANSETRON 4 MG: 2 INJECTION INTRAMUSCULAR; INTRAVENOUS at 12:13

## 2019-09-24 RX ADMIN — KETOROLAC TROMETHAMINE 15 MG: 30 INJECTION, SOLUTION INTRAMUSCULAR at 14:28

## 2019-09-24 RX ADMIN — MORPHINE SULFATE 4 MG: 4 INJECTION, SOLUTION INTRAMUSCULAR; INTRAVENOUS at 12:14

## 2019-09-24 RX ADMIN — SODIUM CHLORIDE 1000 ML: 900 INJECTION, SOLUTION INTRAVENOUS at 12:13

## 2019-09-24 NOTE — DISCHARGE INSTRUCTIONS
Patient Education        Kidney Stone: Care Instructions  Your Care Instructions    Kidney stones are formed when salts, minerals, and other substances normally found in the urine clump together. They can be as small as grains of sand or, rarely, as large as golf balls. While the stone is traveling through the ureter, which is the tube that carries urine from the kidney to the bladder, you will probably feel pain. The pain may be mild or very severe. You may also have some blood in your urine. As soon as the stone reaches the bladder, any intense pain should go away. If a stone is too large to pass on its own, you may need a medical procedure to help you pass the stone. The doctor has checked you carefully, but problems can develop later. If you notice any problems or new symptoms, get medical treatment right away. Follow-up care is a key part of your treatment and safety. Be sure to make and go to all appointments, and call your doctor if you are having problems. It's also a good idea to know your test results and keep a list of the medicines you take. How can you care for yourself at home? · Drink plenty of fluids, enough so that your urine is light yellow or clear like water. If you have kidney, heart, or liver disease and have to limit fluids, talk with your doctor before you increase the amount of fluids you drink. · Take pain medicines exactly as directed. Call your doctor if you think you are having a problem with your medicine. ? If the doctor gave you a prescription medicine for pain, take it as prescribed. ? If you are not taking a prescription pain medicine, ask your doctor if you can take an over-the-counter medicine. Read and follow all instructions on the label. · Your doctor may ask you to strain your urine so that you can collect your kidney stone when it passes. You can use a kitchen strainer or a tea strainer to catch the stone.  Store it in a plastic bag until you see your doctor again.  Preventing future kidney stones  Some changes in your diet may help prevent kidney stones. Depending on the cause of your stones, your doctor may recommend that you:  · Drink plenty of fluids, enough so that your urine is light yellow or clear like water. If you have kidney, heart, or liver disease and have to limit fluids, talk with your doctor before you increase the amount of fluids you drink. · Limit coffee, tea, and alcohol. Also avoid grapefruit juice. · Do not take more than the recommended daily dose of vitamins C and D.  · Avoid antacids such as Gaviscon, Maalox, Mylanta, or Tums. · Limit the amount of salt (sodium) in your diet. · Eat a balanced diet that is not too high in protein. · Limit foods that are high in a substance called oxalate, which can cause kidney stones. These foods include dark green vegetables, rhubarb, chocolate, wheat bran, nuts, cranberries, and beans. When should you call for help? Call your doctor now or seek immediate medical care if:    · You cannot keep down fluids.     · Your pain gets worse.     · You have a fever or chills.     · You have new or worse pain in your back just below your rib cage (the flank area).     · You have new or more blood in your urine.    Watch closely for changes in your health, and be sure to contact your doctor if:    · You do not get better as expected. Where can you learn more? Go to http://jean-claude-rubi.info/. Enter V987 in the search box to learn more about \"Kidney Stone: Care Instructions. \"  Current as of: October 31, 2018  Content Version: 12.2  © 2716-1893 [employer]. Care instructions adapted under license by [employer] (which disclaims liability or warranty for this information).  If you have questions about a medical condition or this instruction, always ask your healthcare professional. [address] any warranty or liability for your use of this information.

## 2019-09-24 NOTE — ED PROVIDER NOTES
HPI     62-year-old man presents with colicky, sometimes constant moderate to severe left flank pain associated with mild nausea which is been going on the last 3 days, worse since last night and this morning. Patient states his pain feels similar to prior kidney stones. His goal was pain relief and to see how big and where this potential stone was at today. Past Medical History:   Diagnosis Date    ED (erectile dysfunction)     Fibrosis lung (HCC)     Hypertension     Kidney stone     Peyronie disease     Prostate cancer ([facility] [address]     Urolithiasis        Past Surgical History:   Procedure Laterality Date    BIOPSY PROSTATE      HX PROSTATECTOMY           No family history on file.     Social History     Socioeconomic History    Marital status:      Spouse name: Not on file    Number of children: Not on file    Years of education: Not on file    Highest education level: Not on file   Occupational History    Not on file   Social Needs    Financial resource strain: Not on file    Food insecurity:     Worry: Not on file     Inability: Not on file    Transportation needs:     Medical: Not on file     Non-medical: Not on file   Tobacco Use    Smoking status: Former Smoker    Smokeless tobacco: Former User   Substance and Sexual Activity    Alcohol use: Yes     Comment: occ    Drug use: No    Sexual activity: Not Currently   Lifestyle    Physical activity:     Days per week: Not on file     Minutes per session: Not on file    Stress: Not on file   Relationships    Social connections:     Talks on phone: Not on file     Gets together: Not on file     Attends Congregational service: Not on file     Active member of club or organization: Not on file     Attends meetings of clubs or organizations: Not on file     Relationship status: Not on file    Intimate partner violence:     Fear of current or ex partner: Not on file     Emotionally abused: Not on file     Physically abused: Not on file Forced sexual activity: Not on file   Other Topics Concern    Not on file   Social History Narrative    Not on file       ALLERGIES: Patient has no known allergies. Review of Systems   Constitutional: Negative for chills and fever. HENT: Negative for ear pain and sore throat. Eyes: Negative for pain and visual disturbance. Respiratory: Negative for cough and shortness of breath. Cardiovascular: Negative for chest pain and palpitations. Gastrointestinal: Positive for nausea. Negative for abdominal pain, diarrhea and vomiting. Genitourinary: Positive for flank pain. Musculoskeletal: Negative for back pain and neck pain. Neurological: Negative for syncope and headaches. Psychiatric/Behavioral: Negative for agitation. The patient is not nervous/anxious. Vitals:    09/24/19 1118 09/24/19 1230 09/24/19 1304   BP: (!) 150/113 (!) 130/97 120/64   Pulse: 62     Resp: 18     Temp: 98.2 °F (36.8 °C)     SpO2: 100% 99% 99%   Weight: 65.8 kg (145 lb)     Height: 5' 9\" (1.753 m)              Physical Exam   Constitutional: He is oriented to person, place, and time. He appears well-developed and well-nourished. He appears distressed (mildly uncomfortable ). HENT:   Head: Normocephalic and atraumatic. Mouth/Throat: Oropharynx is clear and moist.   Eyes: Pupils are equal, round, and reactive to light. Conjunctivae and EOM are normal. No scleral icterus. Neck: Normal range of motion. Neck supple. No tracheal deviation present. Cardiovascular: Normal rate, regular rhythm and intact distal pulses. No murmur heard. Pulmonary/Chest: Effort normal and breath sounds normal. No respiratory distress. Abdominal: Soft. There is no tenderness. Musculoskeletal: Normal range of motion. He exhibits no deformity. Neurological: He is alert and oriented to person, place, and time. No cranial nerve deficit. No gross neuro deficit   Skin: Skin is warm and dry. No rash noted. He is not diaphoretic. Psychiatric: He has a normal mood and affect. Nursing note and vitals reviewed. [facility]  ED Course as of Oct 01 0621   e Sep 24, 2019   [age]-year-old man presents with colicky, sometimes constant moderate to severe left flank pain associated with mild nausea which is been going on the last 3 days, worse since last night and this morning. Patient states his pain feels similar to prior kidney stones. His goal was pain relief and to see how big and where this potential stone was at today. On exam he is resting comfortably, on my evaluation he says that is somewhat improved. He took Tylenol and Motrin prior to arrival.  He denies hematuria. His pain is most concentrated in his left lower flank/back at this time. Also notable for hypertension. Physical exam is nonfocal.  Urinalysis does not show evidence of blood. Will obtain CT imaging. Symptomatic treatment in the ED. [KG]   1353 Bilateral nonobstructing renal calculi CT imaging. [KG]      ED Course User Index  [KG] Maria A Dom R, DO     Labs Reviewed   URINALYSIS W/ RFLX MICROSCOPIC - Abnormal; Notable for the following components:       Result Value    Specific gravity >1.030 (*)     All other components within normal limits   CBC WITH AUTOMATED DIFF - Abnormal; Notable for the following components:    HGB 16.1 (*)     LYMPHOCYTES 19 (*)     All other components within normal limits   METABOLIC PANEL, BASIC - Abnormal; Notable for the following components:    Glucose 121 (*)     BUN 19 (*)     All other components within normal limits     CT ABD PELV W CONT   Final Result   IMPRESSION:      1. Bilateral nonobstructing renal calculi. No evidence of obstruction or other   acute findings. No ureteral or bladder calculi identified. Pain improved on reevaluation. No evidence of stone in the ureter. No evidence of UTI. Patient has no new complaints, changes, or physical findings.    Diagnostic studies if preformed were reviewed with the patient and/or family. All questions and concerns were addressed. Care plan was outlined, including follow-up with PCP/specialist and return precautions were discussed. Patient is felt to be stable for discharge at this time. Procedures      ICD-10-CM ICD-9-CM    1. Left flank pain R10.9 789.09 oxyCODONE IR (ROXICODONE) 5 mg immediate release tablet   2. Renal cyst, left N28.1 753.10    3.  Renal stones N20.0 592.0 oxyCODONE IR (ROXICODONE) 5 mg immediate release tablet       Dispo: Home

## 2019-10-23 ENCOUNTER — HOSPITAL ENCOUNTER (OUTPATIENT)
Dept: CT IMAGING | Age: 64
Discharge: HOME | End: 2019-10-23
Attending: UROLOGY
Payer: COMMERCIAL

## 2019-10-23 DIAGNOSIS — R10.9 FLANK PAIN: ICD-10-CM

## 2019-10-23 DIAGNOSIS — N20.0 KIDNEY STONE: ICD-10-CM

## 2019-10-23 PROCEDURE — 74176 CT ABD & PELVIS W/O CONTRAST: CPT

## 2019-10-30 NOTE — PROGRESS NOTES
Let him know the CT shows a few small non obstructing sones in each kidney. No surgical intervention needed. No tumors or swelling in the kidneys to suggest obstruction.     Shaun

## 2021-08-03 PROBLEM — N52.9 ED (ERECTILE DYSFUNCTION): Status: RESOLVED | Noted: 2021-08-03 | Resolved: 2021-08-03
